# Patient Record
(demographics unavailable — no encounter records)

---

## 2024-11-04 NOTE — ADDENDUM
[FreeTextEntry1] : This note was written by Daniel Giordano on 11/04/2024, acting as a scribe for Valeriano Eid III, MD

## 2024-11-04 NOTE — PHYSICAL EXAM
[de-identified] : Right Knee: Range of Motion in Degrees                     Claimant: Normal:  Flexion Active   135                  135-degrees  Flexion Passive   135                 135-degrees  Extension Active   0-5                 0-5-degrees  Extension Passive   0-5                 0-5-degrees    No weakness to flexion/extension. No evidence of instability in the AP plane or varus or valgus stress.  Negative Lachman.  Negative pivot shift.  Negative anterior drawer test.  Negative posterior drawer test.  Negative Jamil.  Negative Apley grind.  No medial or lateral joint line tenderness.  Positive tenderness over the medial and lateral facet of the patella.  Positive patellofemoral crepitations.  No lateral tilting patella.  No patella apprehension.  Positive crepitation in the medial and lateral femoral condyle.  No proximal or distal swelling, edema or tenderness.  No gross motor or sensory deficits. Mild intra-articular swelling.  2+ DP and PT pulses. No varus or valgus malalignment.  Skin is intact.  No rashes, scars or lesions.   [de-identified] : Gait and Station:  Ambulating with a slightly antalgic to antalgic gait.  Normal Station.  [de-identified] : Appearance:  Well-developed, well-nourished male in no acute distress.   [de-identified] : Radiographs, two views of the right knee taken in the office today, show moderate degenerative changes.

## 2024-11-04 NOTE — DISCUSSION/SUMMARY
[de-identified] : At this time, due to osteoarthritis of the right knee, I recommended a repeat course of viscosupplementation.

## 2024-11-04 NOTE — HISTORY OF PRESENT ILLNESS
[de-identified] : The patient comes in today with increasing complaints of pain to his right knee.

## 2024-11-07 NOTE — PROCEDURE
[de-identified] : Indication:   Osteoarthritis of the right knee  Consent: The risks and benefits of the procedure were discussed with the patient in detail.  Upon verbal consent of the patient, we proceeded with the Supartz injection as noted below.    Description of Procedure:   After sterile prep, the patient underwent a Supartz injection of 25 mg of Sodium Hyaluronate in a 2.5 mL syringe into the right knee.  The patient tolerated the procedure well.  There were no complications.    :  "[employer] NDC#:  [address]-9 Lot#:  [id number] Exp Date:  07/31/2027  Plan: I have recommended ice and elevation.  The patient will be reassessed in one week for the next Supartz injection for the right knee osteoarthritis.

## 2024-11-07 NOTE — PHYSICAL EXAM
[de-identified] : Right Knee:  Knee:  Range of Motion in Degrees:                    Claimant: Normal:  Flexion Active   135                  135-degrees  Flexion Passive   135                 135-degrees  Extension Active   0-5                 0-5-degrees  Extension Passive   0-5                 0-5-degrees    No weakness to flexion/extension. No evidence of instability in the AP plane or varus or valgus stress.  Negative Lachman.  Negative pivot shift.  Negative anterior drawer test.  Negative posterior drawer test.  Negative Jamil.  Negative Apley grind.  No medial or lateral joint line tenderness.  Positive tenderness over the medial and lateral facet of the patella.  Positive patellofemoral crepitations.  No lateral tilting patella.  No patella apprehension.  Positive crepitation in the medial and lateral femoral condyle.  No proximal or distal swelling, edema or tenderness.  No gross motor or sensory deficits. Mild intra-articular swelling.  2+ DP and PT pulses. No varus or valgus malalignment.  Skin is intact.  No rashes, scars or lesions.

## 2024-11-07 NOTE — PHYSICAL EXAM
[de-identified] : Right Knee:  Knee:  Range of Motion in Degrees:                    Claimant: Normal:  Flexion Active   135                  135-degrees  Flexion Passive   135                 135-degrees  Extension Active   0-5                 0-5-degrees  Extension Passive   0-5                 0-5-degrees    No weakness to flexion/extension. No evidence of instability in the AP plane or varus or valgus stress.  Negative Lachman.  Negative pivot shift.  Negative anterior drawer test.  Negative posterior drawer test.  Negative Jamil.  Negative Apley grind.  No medial or lateral joint line tenderness.  Positive tenderness over the medial and lateral facet of the patella.  Positive patellofemoral crepitations.  No lateral tilting patella.  No patella apprehension.  Positive crepitation in the medial and lateral femoral condyle.  No proximal or distal swelling, edema or tenderness.  No gross motor or sensory deficits. Mild intra-articular swelling.  2+ DP and PT pulses. No varus or valgus malalignment.  Skin is intact.  No rashes, scars or lesions.

## 2024-11-07 NOTE — ADDENDUM
[FreeTextEntry1] : This note was written by Lynsey Dey on 11/07/2024 acting as scribe for Valeriano Eid III, MD

## 2024-11-07 NOTE — PROCEDURE
[de-identified] : Indication:   Osteoarthritis of the right knee  Consent: The risks and benefits of the procedure were discussed with the patient in detail.  Upon verbal consent of the patient, we proceeded with the Supartz injection as noted below.    Description of Procedure:   After sterile prep, the patient underwent a Supartz injection of 25 mg of Sodium Hyaluronate in a 2.5 mL syringe into the right knee.  The patient tolerated the procedure well.  There were no complications.    :  [employer] NDC#:  [address]-3 Lot#:  [id number] Exp Date:  07/31/2027  Plan: I have recommended ice and elevation.  The patient will be reassessed in one week for the next Supartz injection for the right knee osteoarthritis.

## 2024-11-14 NOTE — PROCEDURE
[de-identified] : Consent: The risks and benefits of the procedure were discussed with the patient in detail.  Upon verbal consent of the patient, we proceeded with the Supartz injection as noted below.    Procedure: After sterile prep, the patient underwent a Supartz injection of 25 mg of sodium hyaluronate in a 2.5 mL syringe into the right knee.  The patient tolerated the procedure well.  There were no complications.    Indications: Osteoarthritis, right knee : [employer] NDC#: [address]-3 Lot #: [id number] Expiration: 07/31/27  Plan: I have recommended ice and elevation.  The patient will be reassessed in one week for the next Supartz injection for the right knee osteoarthritis.

## 2024-11-14 NOTE — PHYSICAL EXAM
[de-identified] : Right Knee:  Knee:  Range of Motion in Degrees:                    Claimant: Normal:  Flexion Active   135                  135-degrees  Flexion Passive   135                 135-degrees  Extension Active   0-5                 0-5-degrees  Extension Passive   0-5                 0-5-degrees    No weakness to flexion/extension. No evidence of instability in the AP plane or varus or valgus stress.  Negative Lachman.  Negative pivot shift.  Negative anterior drawer test.  Negative posterior drawer test.  Negative Jamil.  Negative Apley grind.  No medial or lateral joint line tenderness.  Positive tenderness over the medial and lateral facet of the patella.  Positive patellofemoral crepitations.  No lateral tilting patella.  No patella apprehension.  Positive crepitation in the medial and lateral femoral condyle.  No proximal or distal swelling, edema or tenderness.  No gross motor or sensory deficits. Mild intra-articular swelling.  2+ DP and PT pulses. No varus or valgus malalignment.  Skin is intact.  No rashes, scars or lesions.

## 2024-11-14 NOTE — ADDENDUM
[FreeTextEntry1] : This note was written by Ema Nassar on 11/14/2024 acting as scribe for Valeriano Eid III, MD

## 2024-11-14 NOTE — REASON FOR VISIT
[Procedure Visit] : a procedure visit for [FreeTextEntry2] : the second Supartz injection to the right knee.

## 2024-12-09 NOTE — ADDENDUM
[FreeTextEntry1] : This note was written by Camille Purdy on 12/09/2024 acting as a scribe for MIGUELANGEL ARNOLD III, MD

## 2024-12-09 NOTE — PHYSICAL EXAM
[de-identified] : Right Knee: Range of Motion in Degrees:   Claimant: Normal: Flexion Active 135   135-degrees Flexion Passive 135   135-degrees Extension Active 0-5   0-5-degrees Extension Passive 0-5   0-5-degrees  No weakness to flexion/extension. No evidence of instability in the AP plane or varus or valgus stress. Negative Lachman. Negative pivot shift. Negative anterior drawer test. Negative posterior drawer test. Negative Jamil. Negative Apley grind. No medial or lateral joint line tenderness. Positive tenderness over the medial and lateral facet of the patella. Positive patellofemoral crepitations. No lateral tilting patella. No patella apprehension. Positive crepitation in the medial and lateral femoral condyle. No proximal or distal swelling, edema or tenderness. No gross motor or sensory deficits. Mild intra-articular swelling. 2+ DP and PT pulses. No varus or valgus malalignment. Skin is intact. No rashes, scars or lesions.

## 2024-12-09 NOTE — PROCEDURE
[de-identified] : Consent: The risks and benefits of the procedure were discussed with the patient in detail. Upon verbal consent of the patient, we proceeded with the Supartz injection as noted below.  Procedure: After sterile prep, the patient underwent a Supartz injection of 25 mg of sodium hyaluronate in a 2.5 mL syringe into the right knee. The patient tolerated the procedure well. There were no complications.  Indications: Osteoarthritis, right knee : [employer] NDC#: [address]-5 Lot #: [id number] Expiration: 07/31/27  Plan: I have recommended ice and elevation. The patient will be reassessed in one week for the next Supartz injection for the right knee osteoarthritis.

## 2024-12-09 NOTE — PROCEDURE
[de-identified] : Consent: The risks and benefits of the procedure were discussed with the patient in detail. Upon verbal consent of the patient, we proceeded with the Supartz injection as noted below.  Procedure: After sterile prep, the patient underwent a Supartz injection of 25 mg of sodium hyaluronate in a 2.5 mL syringe into the right knee. The patient tolerated the procedure well. There were no complications.  Indications: Osteoarthritis, right knee : [employer] NDC#: [address]-0 Lot #: [id number] Expiration: 07/31/27  Plan: I have recommended ice and elevation. The patient will be reassessed in one week for the next Supartz injection for the right knee osteoarthritis.

## 2024-12-09 NOTE — PHYSICAL EXAM
[de-identified] : Right Knee: Range of Motion in Degrees:   Claimant: Normal: Flexion Active 135   135-degrees Flexion Passive 135   135-degrees Extension Active 0-5   0-5-degrees Extension Passive 0-5   0-5-degrees  No weakness to flexion/extension. No evidence of instability in the AP plane or varus or valgus stress. Negative Lachman. Negative pivot shift. Negative anterior drawer test. Negative posterior drawer test. Negative Jamil. Negative Apley grind. No medial or lateral joint line tenderness. Positive tenderness over the medial and lateral facet of the patella. Positive patellofemoral crepitations. No lateral tilting patella. No patella apprehension. Positive crepitation in the medial and lateral femoral condyle. No proximal or distal swelling, edema or tenderness. No gross motor or sensory deficits. Mild intra-articular swelling. 2+ DP and PT pulses. No varus or valgus malalignment. Skin is intact. No rashes, scars or lesions.

## 2024-12-12 NOTE — PHYSICAL EXAM
[de-identified] : Right Knee: Range of Motion in Degrees:   Claimant: Normal: Flexion Active 135   135-degrees Flexion Passive 135   135-degrees Extension Active 0-5   0-5-degrees Extension Passive 0-5   0-5-degrees  No weakness to flexion/extension. No evidence of instability in the AP plane or varus or valgus stress. Negative Lachman. Negative pivot shift. Negative anterior drawer test. Negative posterior drawer test. Negative Jamil. Negative Apley grind. No medial or lateral joint line tenderness. Positive tenderness over the medial and lateral facet of the patella. Positive patellofemoral crepitations. No lateral tilting patella. No patella apprehension. Positive crepitation in the medial and lateral femoral condyle. No proximal or distal swelling, edema or tenderness. No gross motor or sensory deficits. Mild intra-articular swelling. 2+ DP and PT pulses. No varus or valgus malalignment. Skin is intact. No rashes, scars or lesions.

## 2024-12-12 NOTE — PROCEDURE
[de-identified] : Consent: The risks and benefits of the procedure were discussed with the patient in detail.  Upon verbal consent of the patient, we proceeded with the Supartz injection as noted below.    Procedure: After sterile prep, the patient underwent a Supartz injection of 25 mg of sodium hyaluronate in a 2.5 mL syringe into the right knee.  The patient tolerated the procedure well.  There were no complications.    Indications: Osteoarthritis, right knee : [employer] NDC#: [address]-0 Lot #:   [id number] Expiration:  07/31/27  Plan: I have recommended ice and elevation.  The patient will be reassessed in six to eight weeks for the right knee osteoarthritis.

## 2024-12-12 NOTE — ADDENDUM
[FreeTextEntry1] : This note was written by Ema Nassar on 12/12/2024 acting as scribe for Valeriano Eid III, MD
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
## 2025-04-14 NOTE — CONSULT LETTER
[Dear  ___] : Dear  [unfilled], [Consult Letter:] : I had the pleasure of evaluating your patient, [unfilled]. [Please see my note below.] : Please see my note below. [Consult Closing:] : Thank you very much for allowing me to participate in the care of this patient.  If you have any questions, please do not hesitate to contact me. [Sincerely,] : Sincerely, [FreeTextEntry3] :  Naveen Naylor MD FACS

## 2025-04-14 NOTE — ADDENDUM
[FreeTextEntry1] :  Documented by Pierce Lara acting as scribe for Dr. Naylor on 04/14/2025. All Medical record entries made by the Scribe were at my, Dr. Naylor, direction and personally dictated by me on 04/14/2025 . I have reviewed the chart and agree that the record accurately reflects my personal performance of the history, physical exam, assessment and plan. I have also personally directed, reviewed, and agreed with the discharge instructions.

## 2025-05-02 NOTE — PHYSICAL EXAM
[Alert] : alert [Normal Voice/Communication] : normal voice/communication [Healthy Appearing] : healthy appearing [Sclera] : the sclera and conjunctiva were normal [Hearing Threshold Finger Rub Not [place]] : hearing was normal [Normal Lips/Gums] : the lips and gums were normal [Normal Appearance] : the appearance of the neck was normal [No Respiratory Distress] : no respiratory distress [Auscultation Breath Sounds / Voice Sounds] : lungs were clear to auscultation bilaterally [Heart Rate And Rhythm] : heart rate was normal and rhythm regular [Normal S1, S2] : normal S1 and S2 [Bowel Sounds] : normal bowel sounds [Abdomen Tenderness] : non-tender [Abdomen Soft] : soft [Abnormal Walk] : normal gait [Normal Color / Pigmentation] : normal skin color and pigmentation [Oriented To Time, Place, And Person] : oriented to person, place, and time

## 2025-05-02 NOTE — PHYSICAL EXAM
[Alert] : alert [Normal Voice/Communication] : normal voice/communication [Healthy Appearing] : healthy appearing [Sclera] : the sclera and conjunctiva were normal [Hearing Threshold Finger Rub Not [place]] : hearing was normal [Normal Lips/Gums] : the lips and gums were normal [Normal Appearance] : the appearance of the neck was normal [No Respiratory Distress] : no respiratory distress [Auscultation Breath Sounds / Voice Sounds] : lungs were clear to auscultation bilaterally [Heart Rate And Rhythm] : heart rate was normal and rhythm regular [Normal S1, S2] : normal S1 and S2 [Bowel Sounds] : normal bowel sounds [Abdomen Tenderness] : non-tender [Abdomen Soft] : soft [Abnormal Walk] : normal gait [Normal Color / Pigmentation] : normal skin color and pigmentation [Oriented To Time, Place, And Person] : oriented to person, place, and time

## 2025-05-05 NOTE — HISTORY OF PRESENT ILLNESS
[FreeTextEntry1] : Yolanda Chacko is a 66-year-old male with PMHx of HTN, HLD, and Hepatic Steatosis, presents to the office for routine check up. Pt had evidence of Steatosis stage 2 on MURILLO score back in 2022 but has not repeated since. Recent blood work from PCP revealed liver enzymes WDL. Pt also expresses difficulty when eating crackers. Pt begins to cough, sensation of it "getting stuck." Pt has no issues when eating a steak. Last colonoscopy in 2021, last endoscopy in 2022.  Denies FMH of CRC. Denies bowel complaints, typically has bowel movements regularly without significant straining or overt bleeding such as melena or hematochezia. Denies upper GI symptoms such as GERD, nausea, or vomiting. Denies unintentional weight loss.

## 2025-05-19 NOTE — PHYSICAL EXAM
[de-identified] :    Right Hip: Range of Motion in Degrees: 	                                           Claimant:	 Normal:	 Flexion (Active) 	                  120 	         120-degrees	 Flexion (Passive)	                  120	         120-degrees	 Extension (Active)	                  -30	                 -30-degrees	 Extension (Passive)	          -30	                 -30-degrees	 Abduction (Active)	                  45-50	         71-73-rgvfnec	 Abduction (Passive)	          45-50	         05-05-vxwzbof	 Adduction (Active)         	          20-30	         87-09-xcozkel	 Adduction (Passive)	          20-30	         46-89-eeythvc	 Internal Rotation (Active)          35	                 35-degrees	 Internal Rotation (Passive)       35                   35-degrees	 External Rotation (Active)         45	                45-degrees	 External Rotation (Passive)      45	                45-degrees	  No tenderness with internal or external rotation or axial load.  Point tenderness over the greater trochanter with pain with resisted abduction.  Negative Trendelenburg.  No weakness to flexion, extension, abduction or adduction.  No evidence of instability.  No motor or sensory deficits.  2+ DP and PT pulses.  Skin is intact.  No scars, rashes or lesions.    Left Hip: Range of Motion in Degrees: 	                                           Claimant:	 Normal:	 Flexion (Active) 	                  120 	         120-degrees	 Flexion (Passive)	                  120	         120-degrees	 Extension (Active)	                  -30	                 -30-degrees	 Extension (Passive)	          -30	                 -30-degrees	 Abduction (Active)	                  45-50	         78-54-dswlnjr	 Abduction (Passive)	          45-50	         98-78-scxuwzv	 Adduction (Active)         	          20-30	         14-24-nwyrjnz	 Adduction (Passive)	          20-30	         35-18-peoksxt	 Internal Rotation (Active)          35	                 35-degrees	 Internal Rotation (Passive)       35                   35-degrees	 External Rotation (Active)         45	                45-degrees	 External Rotation (Passive)      45	                45-degrees	  No tenderness with internal or external rotation or axial load.  Point tenderness over the greater trochanter with pain with resisted abduction.  Negative Trendelenburg.  No weakness to flexion, extension, abduction or adduction.  No evidence of instability.  No motor or sensory deficits.  2+ DP and PT pulses.  Skin is intact.  No scars, rashes or lesions.    Right Knee: Range of Motion in Degrees: Claimant: Normal: Flexion Active 135 135-degrees Flexion Passive 135 135-degrees Extension Active 0-5 0-5-degrees Extension Passive 0-5 0-5-degrees  No weakness to flexion/extension. No evidence of instability in the AP plane or varus or valgus stress. Negative Lachman. Negative pivot shift. Negative anterior drawer test. Negative posterior drawer test. Negative Jamil. Negative Apley grind. No medial or lateral joint line tenderness. Positive tenderness over the medial and lateral facet of the patella. Positive patellofemoral crepitations. No lateral tilting patella. No patella apprehension. Positive crepitation in the medial and lateral femoral condyle. No proximal or distal swelling, edema or tenderness. No gross motor or sensory deficits. Mild intra-articular swelling. 2+ DP and PT pulses. No varus or valgus malalignment. Skin is intact. No rashes, scars or lesions.   [de-identified] : Gait and Station:  Ambulating with a slightly antalgic to antalgic gait.  Normal Station.  [de-identified] : Appearance:  Well-developed, well-nourished male in no acute distress.   [de-identified] : Radiographs, two views of the right knee taken in the office today, show no interval change. Radiographs, two to three views of the right hip and pelvis taken in the office today, show mild degenerative changes. Radiographs, two to three views of the left hip and pelvis taken in the office today, show mild degenerative changes.

## 2025-05-19 NOTE — REASON FOR VISIT
[Follow-Up Visit] : a follow-up visit for [FreeTextEntry2] : his right knee and a new concern for his bilateral hips

## 2025-05-19 NOTE — DISCUSSION/SUMMARY
[de-identified] : At this time, due to osteoarthritis of the right knee, I recommended a repeat course of viscosupplementation.  For the osteoarthritis of bilateral hips, he was instructed on home therapeutic modalities.  He will be reassessed in 2 weeks should his symptoms warrant for possible injections.

## 2025-05-19 NOTE — ADDENDUM
[FreeTextEntry1] : This note was written by Daniel Giordano on 05/19/2025, acting as a scribe for Valeriano Eid III, MD

## 2025-05-19 NOTE — PHYSICAL EXAM
PHYSICIAN INTERPRETATION    [de-identified] :    Right Hip: Range of Motion in Degrees: 	                                           Claimant:	 Normal:	 Flexion (Active) 	                  120 	         120-degrees	 Flexion (Passive)	                  120	         120-degrees	 Extension (Active)	                  -30	                 -30-degrees	 Extension (Passive)	          -30	                 -30-degrees	 Abduction (Active)	                  45-50	         28-52-attpuzt	 Abduction (Passive)	          45-50	         93-60-ipmgwtn	 Adduction (Active)         	          20-30	         16-38-zwmduwd	 Adduction (Passive)	          20-30	         43-20-zeorgsz	 Internal Rotation (Active)          35	                 35-degrees	 Internal Rotation (Passive)       35                   35-degrees	 External Rotation (Active)         45	                45-degrees	 External Rotation (Passive)      45	                45-degrees	  No tenderness with internal or external rotation or axial load.  Point tenderness over the greater trochanter with pain with resisted abduction.  Negative Trendelenburg.  No weakness to flexion, extension, abduction or adduction.  No evidence of instability.  No motor or sensory deficits.  2+ DP and PT pulses.  Skin is intact.  No scars, rashes or lesions.    Left Hip: Range of Motion in Degrees: 	                                           Claimant:	 Normal:	 Flexion (Active) 	                  120 	         120-degrees	 Flexion (Passive)	                  120	         120-degrees	 Extension (Active)	                  -30	                 -30-degrees	 Extension (Passive)	          -30	                 -30-degrees	 Abduction (Active)	                  45-50	         48-27-rkmzbgu	 Abduction (Passive)	          45-50	         32-18-klqxwms	 Adduction (Active)         	          20-30	         30-39-phxszaq	 Adduction (Passive)	          20-30	         95-50-lrrgrox	 Internal Rotation (Active)          35	                 35-degrees	 Internal Rotation (Passive)       35                   35-degrees	 External Rotation (Active)         45	                45-degrees	 External Rotation (Passive)      45	                45-degrees	  No tenderness with internal or external rotation or axial load.  Point tenderness over the greater trochanter with pain with resisted abduction.  Negative Trendelenburg.  No weakness to flexion, extension, abduction or adduction.  No evidence of instability.  No motor or sensory deficits.  2+ DP and PT pulses.  Skin is intact.  No scars, rashes or lesions.    Right Knee: Range of Motion in Degrees: Claimant: Normal: Flexion Active 135 135-degrees Flexion Passive 135 135-degrees Extension Active 0-5 0-5-degrees Extension Passive 0-5 0-5-degrees  No weakness to flexion/extension. No evidence of instability in the AP plane or varus or valgus stress. Negative Lachman. Negative pivot shift. Negative anterior drawer test. Negative posterior drawer test. Negative Jamil. Negative Apley grind. No medial or lateral joint line tenderness. Positive tenderness over the medial and lateral facet of the patella. Positive patellofemoral crepitations. No lateral tilting patella. No patella apprehension. Positive crepitation in the medial and lateral femoral condyle. No proximal or distal swelling, edema or tenderness. No gross motor or sensory deficits. Mild intra-articular swelling. 2+ DP and PT pulses. No varus or valgus malalignment. Skin is intact. No rashes, scars or lesions.   [de-identified] : Gait and Station:  Ambulating with a slightly antalgic to antalgic gait.  Normal Station.  [de-identified] : Appearance:  Well-developed, well-nourished male in no acute distress.   [de-identified] : Radiographs, two views of the right knee taken in the office today, show no interval change. Radiographs, two to three views of the right hip and pelvis taken in the office today, show mild degenerative changes. Radiographs, two to three views of the left hip and pelvis taken in the office today, show mild degenerative changes.
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
## 2025-05-19 NOTE — DISCUSSION/SUMMARY
[de-identified] : At this time, due to osteoarthritis of the right knee, I recommended a repeat course of viscosupplementation.  For the osteoarthritis of bilateral hips, he was instructed on home therapeutic modalities.  He will be reassessed in 2 weeks should his symptoms warrant for possible injections.
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
## 2025-06-19 NOTE — ADDENDUM
[FreeTextEntry1] : This note was written by Lynsey Dey on 06/19/2025 acting as scribe for Valeriano Eid III, MD

## 2025-06-19 NOTE — PROCEDURE
[de-identified] : Indication:   Osteoarthritis of the right knee  Consent: The risks and benefits of the procedure were discussed with the patient in detail.  Upon verbal consent of the patient, we proceeded with the Supartz injection as noted below.    Description of Procedure:   After sterile prep, the patient underwent a Supartz injection of 25 mg of Sodium Hyaluronate in a 2.5 mL syringe into the right knee.  The patient tolerated the procedure well.  There were no complications.    :  [employer] NDC#:  [address]-8 Lot#:  [id number]  Exp Date:  05/31/2028  Plan: I have recommended ice and elevation.  The patient will be reassessed in one week for the next Supartz injection for the right knee osteoarthritis.

## 2025-06-19 NOTE — PHYSICAL EXAM
[de-identified] : Right Knee: Knee:  Range of Motion in Degrees: Claimant: Normal: Flexion Active 135 135-degrees Flexion Passive 135 135-degrees Extension Active 0-5 0-5-degrees Extension Passive 0-5 0-5-degrees  No weakness to flexion/extension. No evidence of instability in the AP plane or varus or valgus stress. Negative Lachman. Negative pivot shift. Negative anterior drawer test. Negative posterior drawer test. Negative Jamil. Negative Apley grind. No medial or lateral joint line tenderness. Positive tenderness over the medial and lateral facet of the patella. Positive patellofemoral crepitations. No lateral tilting patella. No patella apprehension. Positive crepitation in the medial and lateral femoral condyle. No proximal or distal swelling, edema or tenderness. No gross motor or sensory deficits. Mild intra-articular swelling. 2+ DP and PT pulses. No varus or valgus malalignment. Skin is intact. No rashes, scars or lesions.

## 2025-06-25 NOTE — PROCEDURE
[de-identified] : Consent: The risks and benefits of the procedure were discussed with the patient in detail.  Upon verbal consent of the patient, we proceeded with the Supartz injection as noted below.    Procedure: After sterile prep, the patient underwent a Supartz injection of 25 mg of sodium hyaluronate in a 2.5 mL syringe into the right knee.  The patient tolerated the procedure well.  There were no complications.    Indications: Osteoarthritis, right knee : [employer] NDC#: [address]-9 Lot #:   [id number] Expiration:  05/31/28  Plan: I have recommended ice and elevation.  The patient will be reassessed in one week for the next Supartz injection for the right knee osteoarthritis.

## 2025-06-25 NOTE — ADDENDUM
[FreeTextEntry1] : This note was written by Ema Nassar on 06/25/2025 acting as scribe for Cathy Mark OTR/BRITTANY, PA.

## 2025-06-25 NOTE — PHYSICAL EXAM
[de-identified] : Right Knee: Knee:  Range of Motion in Degrees: Claimant: Normal: Flexion Active 135 135-degrees Flexion Passive 135 135-degrees Extension Active 0-5 0-5-degrees Extension Passive 0-5 0-5-degrees  No weakness to flexion/extension. No evidence of instability in the AP plane or varus or valgus stress. Negative Lachman. Negative pivot shift. Negative anterior drawer test. Negative posterior drawer test. Negative Jamil. Negative Apley grind. No medial or lateral joint line tenderness. Positive tenderness over the medial and lateral facet of the patella. Positive patellofemoral crepitations. No lateral tilting patella. No patella apprehension. Positive crepitation in the medial and lateral femoral condyle. No proximal or distal swelling, edema or tenderness. No gross motor or sensory deficits. Mild intra-articular swelling. 2+ DP and PT pulses. No varus or valgus malalignment. Skin is intact. No rashes, scars or lesions.

## 2025-07-07 NOTE — PROCEDURE
[de-identified] : Consent: The risks and benefits of the procedure were discussed with the patient in detail. Upon verbal consent of the patient, we proceeded with the Supartz injection as noted below.  Procedure: After sterile prep, the patient underwent a Supartz injection of 25 mg of sodium hyaluronate in a 2.5 mL syringe into the right knee. The patient tolerated the procedure well. There were no complications.  Indications: Osteoarthritis, right knee : [employer] NDC#: [address]-0 Lot #: [id number] Expiration: 05/31/28  Plan: I have recommended ice and elevation. The patient will be reassessed in one week for the next Supartz injection for the right knee osteoarthritis.

## 2025-07-07 NOTE — PHYSICAL EXAM
[de-identified] : Right Knee: Range of Motion in Degrees: Claimant: Normal: Flexion Active 135 135-degrees Flexion Passive 135 135-degrees Extension Active 0-5 0-5-degrees Extension Passive 0-5 0-5-degrees  No weakness to flexion/extension. No evidence of instability in the AP plane or varus or valgus stress. Negative Lachman. Negative pivot shift. Negative anterior drawer test. Negative posterior drawer test. Negative Jamil. Negative Apley grind. No medial or lateral joint line tenderness. Positive tenderness over the medial and lateral facet of the patella. Positive patellofemoral crepitations. No lateral tilting patella. No patella apprehension. Positive crepitation in the medial and lateral femoral condyle. No proximal or distal swelling, edema or tenderness. No gross motor or sensory deficits. Mild intra-articular swelling. 2+ DP and PT pulses. No varus or valgus malalignment. Skin is intact. No rashes, scars or lesions.

## 2025-07-07 NOTE — PHYSICAL EXAM
[de-identified] : Right Knee: Range of Motion in Degrees: Claimant: Normal: Flexion Active 135 135-degrees Flexion Passive 135 135-degrees Extension Active 0-5 0-5-degrees Extension Passive 0-5 0-5-degrees  No weakness to flexion/extension. No evidence of instability in the AP plane or varus or valgus stress. Negative Lachman. Negative pivot shift. Negative anterior drawer test. Negative posterior drawer test. Negative Jamil. Negative Apley grind. No medial or lateral joint line tenderness. Positive tenderness over the medial and lateral facet of the patella. Positive patellofemoral crepitations. No lateral tilting patella. No patella apprehension. Positive crepitation in the medial and lateral femoral condyle. No proximal or distal swelling, edema or tenderness. No gross motor or sensory deficits. Mild intra-articular swelling. 2+ DP and PT pulses. No varus or valgus malalignment. Skin is intact. No rashes, scars or lesions.

## 2025-07-07 NOTE — PROCEDURE
[de-identified] : Consent: The risks and benefits of the procedure were discussed with the patient in detail. Upon verbal consent of the patient, we proceeded with the Supartz injection as noted below.  Procedure: After sterile prep, the patient underwent a Supartz injection of 25 mg of sodium hyaluronate in a 2.5 mL syringe into the right knee. The patient tolerated the procedure well. There were no complications.  Indications: Osteoarthritis, right knee : [employer] NDC#: [address]-5 Lot #: [id number] Expiration: 05/31/28  Plan: I have recommended ice and elevation. The patient will be reassessed in one week for the next Supartz injection for the right knee osteoarthritis.

## 2025-07-07 NOTE — ADDENDUM
[FreeTextEntry1] : This note was written by Camille Purdy on 07/07/2025 acting as a scribe for MIGUELANGEL ARNOLD III, MD

## 2025-07-10 NOTE — ADDENDUM
[FreeTextEntry1] : This note was written by Lynsey Dey on 07/10/2025 acting as scribe for Valeriano Eid III, MD

## 2025-07-10 NOTE — PHYSICAL EXAM
[de-identified] : Right Knee: Knee:  Range of Motion in Degrees: Claimant: Normal: Flexion Active 135 135-degrees Flexion Passive 135 135-degrees Extension Active 0-5 0-5-degrees Extension Passive 0-5 0-5-degrees  No weakness to flexion/extension. No evidence of instability in the AP plane or varus or valgus stress. Negative Lachman. Negative pivot shift. Negative anterior drawer test. Negative posterior drawer test. Negative Jamil. Negative Apley grind. No medial or lateral joint line tenderness. Positive tenderness over the medial and lateral facet of the patella. Positive patellofemoral crepitations. No lateral tilting patella. No patella apprehension. Positive crepitation in the medial and lateral femoral condyle. No proximal or distal swelling, edema or tenderness. No gross motor or sensory deficits. Mild intra-articular swelling. 2+ DP and PT pulses. No varus or valgus malalignment. Skin is intact. No rashes, scars or lesions.

## 2025-07-10 NOTE — PROCEDURE
[de-identified] : Indication: Osteoarthritis right knee  Consent: The risks and benefits of the procedure were discussed with the patient in detail. Upon verbal consent of the patient, we proceeded with the Supartz injection as noted below.  Procedure: After sterile prep, the patient underwent a Supartz injection of 25 mg of sodium hyaluronate in a 2.5 mL syringe into the right knee. The patient tolerated the procedure well. There were no complications.  : [employer] NDC#: [address]-5 Lot #: [id number] Expiration: 05/31/2028  Plan: I have recommended ice and elevation. The patient will be reassessed in one week for the next Supartz injection for the right knee osteoarthritis.

## 2025-07-10 NOTE — PROCEDURE
[de-identified] : Indication: Osteoarthritis right knee  Consent: The risks and benefits of the procedure were discussed with the patient in detail. Upon verbal consent of the patient, we proceeded with the Supartz injection as noted below.  Procedure: After sterile prep, the patient underwent a Supartz injection of 25 mg of sodium hyaluronate in a 2.5 mL syringe into the right knee. The patient tolerated the procedure well. There were no complications.  : [employer] NDC#: [address]-0 Lot #: [id number] Expiration: 05/31/2028  Plan: I have recommended ice and elevation. The patient will be reassessed in one week for the next Supartz injection for the right knee osteoarthritis.

## 2025-07-10 NOTE — PHYSICAL EXAM
[de-identified] : Right Knee: Knee:  Range of Motion in Degrees: Claimant: Normal: Flexion Active 135 135-degrees Flexion Passive 135 135-degrees Extension Active 0-5 0-5-degrees Extension Passive 0-5 0-5-degrees  No weakness to flexion/extension. No evidence of instability in the AP plane or varus or valgus stress. Negative Lachman. Negative pivot shift. Negative anterior drawer test. Negative posterior drawer test. Negative Jamil. Negative Apley grind. No medial or lateral joint line tenderness. Positive tenderness over the medial and lateral facet of the patella. Positive patellofemoral crepitations. No lateral tilting patella. No patella apprehension. Positive crepitation in the medial and lateral femoral condyle. No proximal or distal swelling, edema or tenderness. No gross motor or sensory deficits. Mild intra-articular swelling. 2+ DP and PT pulses. No varus or valgus malalignment. Skin is intact. No rashes, scars or lesions.

## 2025-07-21 NOTE — PHYSICAL EXAM
[de-identified] : Right Knee: Range of Motion in Degrees: Claimant: Normal: Flexion Active 135 135-degrees Flexion Passive 135 135-degrees Extension Active 0-5 0-5-degrees Extension Passive 0-5 0-5-degrees  No weakness to flexion/extension. No evidence of instability in the AP plane or varus or valgus stress. Negative Lachman. Negative pivot shift. Negative anterior drawer test. Negative posterior drawer test. Negative Jamil. Negative Apley grind. No medial or lateral joint line tenderness. Positive tenderness over the medial and lateral facet of the patella. Positive patellofemoral crepitations. No lateral tilting patella. No patella apprehension. Positive crepitation in the medial and lateral femoral condyle. No proximal or distal swelling, edema or tenderness. No gross motor or sensory deficits. Mild intra-articular swelling. 2+ DP and PT pulses. No varus or valgus malalignment. Skin is intact. No rashes, scars or lesions.

## 2025-07-21 NOTE — ADDENDUM
[FreeTextEntry1] : This note was written by Camille Purdy on 07/21/2025 acting as scribe for Cathy Mark, OTR/L, PA

## 2025-07-21 NOTE — PROCEDURE
[de-identified] : Indication: Osteoarthritis right knee  Consent: The risks and benefits of the procedure were discussed with the patient in detail. Upon verbal consent of the patient, we proceeded with the Supartz injection as noted below.  Procedure: After sterile prep, the patient underwent a Supartz injection of 25 mg of sodium hyaluronate in a 2.5 mL syringe into the right knee. The patient tolerated the procedure well. There were no complications.  : [employer] NDC#: [address]-5 Lot #: [id number] Expiration: 05/31/2028  Plan: I have recommended ice and elevation. The patient will be reassessed in six to eight weeks for the right knee osteoarthritis.